# Patient Record
Sex: FEMALE | Race: BLACK OR AFRICAN AMERICAN
[De-identification: names, ages, dates, MRNs, and addresses within clinical notes are randomized per-mention and may not be internally consistent; named-entity substitution may affect disease eponyms.]

---

## 2018-04-23 ENCOUNTER — HOSPITAL ENCOUNTER (EMERGENCY)
Dept: HOSPITAL 54 - ER | Age: 28
Discharge: HOME | End: 2018-04-23
Payer: MEDICAID

## 2018-04-23 VITALS — SYSTOLIC BLOOD PRESSURE: 125 MMHG | DIASTOLIC BLOOD PRESSURE: 74 MMHG

## 2018-04-23 VITALS — BODY MASS INDEX: 29.02 KG/M2 | WEIGHT: 170 LBS | HEIGHT: 64 IN

## 2018-04-23 DIAGNOSIS — J03.90: Primary | ICD-10-CM

## 2018-04-23 PROCEDURE — Z7610: HCPCS

## 2018-04-23 PROCEDURE — A4606 OXYGEN PROBE USED W OXIMETER: HCPCS

## 2018-04-23 RX ADMIN — IBUPROFEN ONE MG: 600 TABLET, FILM COATED ORAL at 21:48

## 2018-04-23 RX ADMIN — PENICILLIN G BENZATHINE ONE MMU: 2400000 INJECTION, SUSPENSION INTRAMUSCULAR at 22:37

## 2019-06-08 ENCOUNTER — HOSPITAL ENCOUNTER (EMERGENCY)
Dept: HOSPITAL 54 - ER | Age: 29
Discharge: HOME | End: 2019-06-08
Payer: MEDICAID

## 2019-06-08 VITALS — WEIGHT: 188 LBS | BODY MASS INDEX: 32.1 KG/M2 | HEIGHT: 64 IN

## 2019-06-08 DIAGNOSIS — N61.0: ICD-10-CM

## 2019-06-08 DIAGNOSIS — Z98.890: ICD-10-CM

## 2019-06-08 DIAGNOSIS — Z41.1: ICD-10-CM

## 2019-06-08 DIAGNOSIS — N61.1: Primary | ICD-10-CM

## 2019-06-08 LAB
BASOPHILS # BLD AUTO: 0 /CMM (ref 0–0.2)
BASOPHILS NFR BLD AUTO: 0.4 % (ref 0–2)
BUN SERPL-MCNC: 6 MG/DL (ref 7–18)
CALCIUM SERPL-MCNC: 9.3 MG/DL (ref 8.5–10.1)
CHLORIDE SERPL-SCNC: 99 MMOL/L (ref 98–107)
CO2 SERPL-SCNC: 28 MMOL/L (ref 21–32)
CREAT SERPL-MCNC: 0.6 MG/DL (ref 0.6–1.3)
EOSINOPHIL NFR BLD AUTO: 1.6 % (ref 0–6)
GLUCOSE SERPL-MCNC: 93 MG/DL (ref 74–106)
HCT VFR BLD AUTO: 34 % (ref 33–45)
HGB BLD-MCNC: 10.9 G/DL (ref 11.5–14.8)
LYMPHOCYTES NFR BLD AUTO: 1.4 /CMM (ref 0.8–4.8)
LYMPHOCYTES NFR BLD AUTO: 15.6 % (ref 20–44)
MCHC RBC AUTO-ENTMCNC: 32 G/DL (ref 31–36)
MCV RBC AUTO: 80 FL (ref 82–100)
MONOCYTES NFR BLD AUTO: 0.9 /CMM (ref 0.1–1.3)
MONOCYTES NFR BLD AUTO: 9.7 % (ref 2–12)
NEUTROPHILS # BLD AUTO: 6.7 /CMM (ref 1.8–8.9)
NEUTROPHILS NFR BLD AUTO: 72.7 % (ref 43–81)
PLATELET # BLD AUTO: 379 /CMM (ref 150–450)
POTASSIUM SERPL-SCNC: 4.2 MMOL/L (ref 3.5–5.1)
RBC # BLD AUTO: 4.26 MIL/UL (ref 4–5.2)
SODIUM SERPL-SCNC: 136 MMOL/L (ref 136–145)
WBC NRBC COR # BLD AUTO: 9.2 K/UL (ref 4.3–11)

## 2019-06-08 PROCEDURE — 99285 EMERGENCY DEPT VISIT HI MDM: CPT

## 2019-06-08 PROCEDURE — 87081 CULTURE SCREEN ONLY: CPT

## 2019-06-08 PROCEDURE — 36415 COLL VENOUS BLD VENIPUNCTURE: CPT

## 2019-06-08 PROCEDURE — 96365 THER/PROPH/DIAG IV INF INIT: CPT

## 2019-06-08 PROCEDURE — 85025 COMPLETE CBC W/AUTO DIFF WBC: CPT

## 2019-06-08 PROCEDURE — 96375 TX/PRO/DX INJ NEW DRUG ADDON: CPT

## 2019-06-08 PROCEDURE — 80048 BASIC METABOLIC PNL TOTAL CA: CPT

## 2019-06-08 PROCEDURE — 93005 ELECTROCARDIOGRAM TRACING: CPT

## 2019-06-08 PROCEDURE — G0378 HOSPITAL OBSERVATION PER HR: HCPCS

## 2019-06-08 PROCEDURE — 87077 CULTURE AEROBIC IDENTIFY: CPT

## 2019-06-08 PROCEDURE — 87070 CULTURE OTHR SPECIMN AEROBIC: CPT

## 2019-06-08 NOTE — NUR
PT SEEN BY DR. TURCIOS. PT WILL BE DICHARGED HOME WITH ATB.  PT INSTRUCTED TO 
FOLLOW UP WITH PCP WITHIN 1 WEEK.

## 2019-06-09 VITALS — SYSTOLIC BLOOD PRESSURE: 129 MMHG | DIASTOLIC BLOOD PRESSURE: 70 MMHG

## 2020-01-21 ENCOUNTER — HOSPITAL ENCOUNTER (EMERGENCY)
Dept: HOSPITAL 54 - ER | Age: 30
Discharge: HOME | End: 2020-01-21
Payer: SELF-PAY

## 2020-01-21 VITALS — SYSTOLIC BLOOD PRESSURE: 122 MMHG | DIASTOLIC BLOOD PRESSURE: 65 MMHG

## 2020-01-21 VITALS — WEIGHT: 180 LBS | HEIGHT: 64 IN | BODY MASS INDEX: 30.73 KG/M2

## 2020-01-21 DIAGNOSIS — Z98.890: ICD-10-CM

## 2020-01-21 DIAGNOSIS — N61.0: ICD-10-CM

## 2020-01-21 DIAGNOSIS — L03.115: Primary | ICD-10-CM

## 2020-01-21 DIAGNOSIS — Z41.1: ICD-10-CM

## 2020-01-21 DIAGNOSIS — L03.114: ICD-10-CM
